# Patient Record
(demographics unavailable — no encounter records)

---

## 2024-11-08 NOTE — REVIEW OF SYSTEMS
[Headache] : headache [Dizziness] : dizziness [Fever] : no fever [Chills] : no chills [Earache] : no earache [Nasal Discharge] : no nasal discharge [Sore Throat] : no sore throat [Chest Pain] : no chest pain [Palpitations] : no palpitations [Shortness Of Breath] : no shortness of breath [Cough] : no cough [Abdominal Pain] : no abdominal pain [Nausea] : no nausea [Constipation] : no constipation [Diarrhea] : diarrhea [Vomiting] : no vomiting [Dysuria] : no dysuria [Frequency] : no frequency [Joint Pain] : no joint pain [Muscle Pain] : no muscle pain [Itching] : no itching [Mole Changes] : no mole changes [Skin Rash] : no skin rash

## 2024-11-08 NOTE — HISTORY OF PRESENT ILLNESS
[de-identified] : 64yo female with PMH of fibromyalgia, migraines, HLD who presents to the office for annual physical examination.   She was seen in August 2024 with complaint of migraines.  She was seen by ENT who thought it was not due to her hearing. She then followed up with audiology and found to have bilateral hearing loss and will be pursuing hearing aides.  She was seen by neurologist Dr. Johnson who ordered an MRI for further evaluation. Her neurologist is concerned that there may have been damage to her vagus nerve with the snapping of her neck. She is undergoing stim therapy directed at vagus nerve and is feeling better.   Follows with [facility] Dr. Velazquez. Reports she had a normal examination.    diagnosed with prostate cancer.  She has since changed her diet, trying to eat clean and avoiding processed foods.  Her  has lost weight, but she has not yet lost weight.  She reports exercise is limited due to vertigo.   Patient stubbed her left fourth toe a few days ago. Area is purple. Has been using light therapy with improvement. Reports coloring improved. Denies significant pain in the area. Does not think it is broken.

## 2024-11-08 NOTE — HEALTH RISK ASSESSMENT
[Never] : Never [Patient reported PAP Smear was normal] : Patient reported PAP Smear was normal [Good] : ~his/her~  mood as  good [No] : No [Patient reported mammogram was normal] : Patient reported mammogram was normal [Patient reported colonoscopy was normal] : Patient reported colonoscopy was normal [With Significant Other] : lives with significant other [Employed] : employed [] :  [Reports changes in hearing] : Reports changes in hearing [de-identified] : limited due to vertigo [de-identified] : clean eating  [Reports changes in vision] : Reports no changes in vision [Reports changes in dental health] : Reports no changes in dental health [MammogramDate] : 10/24 [ColonoscopyDate] : 01/14 [de-identified] : being evaluated for hearing aides

## 2024-11-08 NOTE — PHYSICAL EXAM
[No Acute Distress] : no acute distress [Well-Appearing] : well-appearing [Normal Sclera/Conjunctiva] : normal sclera/conjunctiva [EOMI] : extraocular movements intact [Normal Outer Ear/Nose] : the outer ears and nose were normal in appearance [Normal Oropharynx] : the oropharynx was normal [No Lymphadenopathy] : no lymphadenopathy [Supple] : supple [Thyroid Normal, No Nodules] : the thyroid was normal and there were no nodules present [No Respiratory Distress] : no respiratory distress  [Clear to Auscultation] : lungs were clear to auscultation bilaterally [Normal Rate] : normal rate  [Regular Rhythm] : with a regular rhythm [Normal S1, S2] : normal S1 and S2 [Soft] : abdomen soft [Non Tender] : non-tender [Non-distended] : non-distended [No Joint Swelling] : no joint swelling [Grossly Normal Strength/Tone] : grossly normal strength/tone [No Rash] : no rash [Coordination Grossly Intact] : coordination grossly intact [No Focal Deficits] : no focal deficits [Normal Gait] : normal gait [Normal Affect] : the affect was normal [Normal Insight/Judgement] : insight and judgment were intact